# Patient Record
Sex: FEMALE | Race: WHITE | NOT HISPANIC OR LATINO | Employment: OTHER | URBAN - METROPOLITAN AREA
[De-identification: names, ages, dates, MRNs, and addresses within clinical notes are randomized per-mention and may not be internally consistent; named-entity substitution may affect disease eponyms.]

---

## 2024-08-03 ENCOUNTER — APPOINTMENT (OUTPATIENT)
Dept: CT IMAGING | Facility: CLINIC | Age: 74
End: 2024-08-03
Attending: EMERGENCY MEDICINE
Payer: COMMERCIAL

## 2024-08-03 ENCOUNTER — HOSPITAL ENCOUNTER (EMERGENCY)
Facility: CLINIC | Age: 74
Discharge: HOME | End: 2024-08-03
Attending: EMERGENCY MEDICINE | Admitting: EMERGENCY MEDICINE
Payer: COMMERCIAL

## 2024-08-03 VITALS
DIASTOLIC BLOOD PRESSURE: 76 MMHG | TEMPERATURE: 98 F | BODY MASS INDEX: 29.02 KG/M2 | HEART RATE: 75 BPM | SYSTOLIC BLOOD PRESSURE: 132 MMHG | WEIGHT: 170 LBS | RESPIRATION RATE: 16 BRPM | OXYGEN SATURATION: 99 % | HEIGHT: 64 IN

## 2024-08-03 DIAGNOSIS — R10.9 ABDOMINAL PAIN, UNSPECIFIED ABDOMINAL LOCATION: ICD-10-CM

## 2024-08-03 DIAGNOSIS — K52.9 ENTERITIS: ICD-10-CM

## 2024-08-03 LAB
ALBUMIN SERPL BCG-MCNC: 4.5 G/DL (ref 3.5–5.2)
ALP SERPL-CCNC: 58 U/L (ref 40–150)
ALT SERPL W P-5'-P-CCNC: 29 U/L (ref 0–50)
ANION GAP SERPL CALCULATED.3IONS-SCNC: 10 MMOL/L (ref 7–15)
AST SERPL W P-5'-P-CCNC: 30 U/L (ref 0–45)
BASOPHILS # BLD AUTO: 0.1 10E3/UL (ref 0–0.2)
BASOPHILS NFR BLD AUTO: 1 %
BILIRUB SERPL-MCNC: 0.6 MG/DL
BUN SERPL-MCNC: 16.5 MG/DL (ref 8–23)
CALCIUM SERPL-MCNC: 9.3 MG/DL (ref 8.8–10.4)
CHLORIDE SERPL-SCNC: 101 MMOL/L (ref 98–107)
CREAT SERPL-MCNC: 0.75 MG/DL (ref 0.51–0.95)
EGFRCR SERPLBLD CKD-EPI 2021: 84 ML/MIN/1.73M2
EOSINOPHIL # BLD AUTO: 0.1 10E3/UL (ref 0–0.7)
EOSINOPHIL NFR BLD AUTO: 3 %
ERYTHROCYTE [DISTWIDTH] IN BLOOD BY AUTOMATED COUNT: 11.9 % (ref 10–15)
GLUCOSE SERPL-MCNC: 84 MG/DL (ref 70–99)
HCO3 SERPL-SCNC: 27 MMOL/L (ref 22–29)
HCT VFR BLD AUTO: 41.2 % (ref 35–47)
HGB BLD-MCNC: 13.5 G/DL (ref 11.7–15.7)
HOLD SPECIMEN: NORMAL
HOLD SPECIMEN: NORMAL
IMM GRANULOCYTES # BLD: 0 10E3/UL
IMM GRANULOCYTES NFR BLD: 0 %
LYMPHOCYTES # BLD AUTO: 1.6 10E3/UL (ref 0.8–5.3)
LYMPHOCYTES NFR BLD AUTO: 28 %
MCH RBC QN AUTO: 32.7 PG (ref 26.5–33)
MCHC RBC AUTO-ENTMCNC: 32.8 G/DL (ref 31.5–36.5)
MCV RBC AUTO: 100 FL (ref 78–100)
MONOCYTES # BLD AUTO: 0.5 10E3/UL (ref 0–1.3)
MONOCYTES NFR BLD AUTO: 8 %
NEUTROPHILS # BLD AUTO: 3.3 10E3/UL (ref 1.6–8.3)
NEUTROPHILS NFR BLD AUTO: 60 %
NRBC # BLD AUTO: 0 10E3/UL
NRBC BLD AUTO-RTO: 0 /100
PLATELET # BLD AUTO: 162 10E3/UL (ref 150–450)
POTASSIUM SERPL-SCNC: 4 MMOL/L (ref 3.4–5.3)
PROT SERPL-MCNC: 7.3 G/DL (ref 6.4–8.3)
RBC # BLD AUTO: 4.13 10E6/UL (ref 3.8–5.2)
SODIUM SERPL-SCNC: 138 MMOL/L (ref 135–145)
WBC # BLD AUTO: 5.5 10E3/UL (ref 4–11)

## 2024-08-03 PROCEDURE — 250N000013 HC RX MED GY IP 250 OP 250 PS 637: Performed by: EMERGENCY MEDICINE

## 2024-08-03 PROCEDURE — 250N000011 HC RX IP 250 OP 636: Performed by: EMERGENCY MEDICINE

## 2024-08-03 PROCEDURE — 80053 COMPREHEN METABOLIC PANEL: CPT | Performed by: EMERGENCY MEDICINE

## 2024-08-03 PROCEDURE — 36415 COLL VENOUS BLD VENIPUNCTURE: CPT | Performed by: EMERGENCY MEDICINE

## 2024-08-03 PROCEDURE — 74177 CT ABD & PELVIS W/CONTRAST: CPT

## 2024-08-03 PROCEDURE — 85025 COMPLETE CBC W/AUTO DIFF WBC: CPT | Performed by: EMERGENCY MEDICINE

## 2024-08-03 PROCEDURE — 99285 EMERGENCY DEPT VISIT HI MDM: CPT | Mod: 25

## 2024-08-03 PROCEDURE — 250N000009 HC RX 250: Performed by: EMERGENCY MEDICINE

## 2024-08-03 RX ORDER — ACETAMINOPHEN 500 MG
1000 TABLET ORAL ONCE
Status: COMPLETED | OUTPATIENT
Start: 2024-08-03 | End: 2024-08-03

## 2024-08-03 RX ORDER — IOPAMIDOL 755 MG/ML
85 INJECTION, SOLUTION INTRAVASCULAR ONCE
Status: COMPLETED | OUTPATIENT
Start: 2024-08-03 | End: 2024-08-03

## 2024-08-03 RX ADMIN — ACETAMINOPHEN 1000 MG: 500 TABLET, FILM COATED ORAL at 14:54

## 2024-08-03 RX ADMIN — SODIUM CHLORIDE 64 ML: 9 INJECTION, SOLUTION INTRAVENOUS at 14:58

## 2024-08-03 RX ADMIN — IOPAMIDOL 85 ML: 755 INJECTION, SOLUTION INTRAVENOUS at 14:57

## 2024-08-03 ASSESSMENT — ACTIVITIES OF DAILY LIVING (ADL)
ADLS_ACUITY_SCORE: 33
ADLS_ACUITY_SCORE: 35
ADLS_ACUITY_SCORE: 33
ADLS_ACUITY_SCORE: 35

## 2024-08-03 ASSESSMENT — COLUMBIA-SUICIDE SEVERITY RATING SCALE - C-SSRS
2. HAVE YOU ACTUALLY HAD ANY THOUGHTS OF KILLING YOURSELF IN THE PAST MONTH?: NO
6. HAVE YOU EVER DONE ANYTHING, STARTED TO DO ANYTHING, OR PREPARED TO DO ANYTHING TO END YOUR LIFE?: NO
1. IN THE PAST MONTH, HAVE YOU WISHED YOU WERE DEAD OR WISHED YOU COULD GO TO SLEEP AND NOT WAKE UP?: NO

## 2024-08-03 NOTE — ED PROVIDER NOTES
"  Emergency Department Note      History of Present Illness     Chief Complaint   Abdominal Pain (LLQ)      HPI   Sylvia Thompson is a 74 year old female with a history of diverticulosis who presents with lower left quadrant abdominal pain. This pain began yesterday and reportedly feels like her prior instances of diverticulosis. She denies constipation, rectal bleeding, nausea, and emesis. NO urinary symptoms. Her last tylenol was at 0600.    Independent Historian   None        Past Medical History     Medical History and Problem List   Acute lower GI bleeding  Atherosclerosis of coronary artery  Colitis  Diverticula, colon  Hypertension  Floaters  Hyperlipidemia  Lesion of plantar nerve  Oral infection  Pain of right thumb  Widening aorta    Medications   Tylenol  Augmentin  Atorvastatin  Dulcolax  Cipro  Diflucan  Zestril  Metoprolol  Flagyl  Macrobid  Zofran      Surgical History   Hysterectomy  Appendectomy  Hand surgery    Physical Exam     Patient Vitals for the past 24 hrs:   BP Temp Temp src Pulse Resp SpO2 Height Weight   08/03/24 1620 132/76 -- -- 75 16 99 % -- --   08/03/24 1430 129/71 -- -- 64 16 97 % -- --   08/03/24 1415 132/70 -- -- -- 16 99 % -- --   08/03/24 1055 129/72 -- -- -- -- -- -- --   08/03/24 1054 -- 98  F (36.7  C) Temporal 67 16 97 % 1.626 m (5' 4\") 77.1 kg (170 lb)     Physical Exam  VS: Reviewed per above  HENT: Mucous membranes moist  EYES: sclera anicteric  CV: Rate as noted,  regular rhythm.   RESP: Effort normal. Breath sounds are normal bilaterally.  GI: mild llq tenderness without rebound/guarding, not distended.  NEURO: Alert, moving all extremities  MSK: No deformity of the extremities  SKIN: Warm and dry    Diagnostics     Lab Results   Labs Ordered and Resulted from Time of ED Arrival to Time of ED Departure   COMPREHENSIVE METABOLIC PANEL - Normal       Result Value    Sodium 138      Potassium 4.0      Carbon Dioxide (CO2) 27      Anion Gap 10      Urea Nitrogen 16.5      " Creatinine 0.75      GFR Estimate 84      Calcium 9.3      Chloride 101      Glucose 84      Alkaline Phosphatase 58      AST 30      ALT 29      Protein Total 7.3      Albumin 4.5      Bilirubin Total 0.6     CBC WITH PLATELETS AND DIFFERENTIAL    WBC Count 5.5      RBC Count 4.13      Hemoglobin 13.5      Hematocrit 41.2            MCH 32.7      MCHC 32.8      RDW 11.9      Platelet Count 162      % Neutrophils 60      % Lymphocytes 28      % Monocytes 8      % Eosinophils 3      % Basophils 1      % Immature Granulocytes 0      NRBCs per 100 WBC 0      Absolute Neutrophils 3.3      Absolute Lymphocytes 1.6      Absolute Monocytes 0.5      Absolute Eosinophils 0.1      Absolute Basophils 0.1      Absolute Immature Granulocytes 0.0      Absolute NRBCs 0.0       Imaging   CT Abdomen Pelvis w Contrast   Final Result   IMPRESSION:    1.  Mild diffuse wall thickening of multiple jejunal loops which can be seen with an enteritis.   2.  Colonic diverticulosis without findings of acute diverticulitis.      Report per radiology.    ED Course      Medications Administered   Medications   acetaminophen (TYLENOL) tablet 1,000 mg (1,000 mg Oral $Given 8/3/24 1454)   iopamidol (ISOVUE-370) solution 85 mL (85 mLs Intravenous $Given 8/3/24 1457)   sodium chloride 0.9 % bag 500mL for CT scan flush use (64 mLs Intravenous $Given 8/3/24 1458)       Procedures   Procedures     Discussion of Management   None    ED Course   ED Course as of 08/03/24 1737   Sat Aug 03, 2024   1443 I obtained history and examined the patient as noted above.     1608 I re-evaluated and updated patient.  I explained findings to the patient and we discussed plan for discharge. The patient is comfortable with this plan.         Additional Documentation  None    Medical Decision Making / Diagnosis     CMS Diagnoses: None    MIPS       None    MDM   Sylvia Thompson is a 74 year old female who presents to the ER for evaluation of few days of left lower  abdominal discomfort in the setting of history of diverticulitis.  Vital signs are reassuring.  Her abdominal exam is actually quite benign despite some mild left lower quadrant tenderness.  Labs are reassuring without leukocytosis or electrolyte derangement or GENE.  CT abdomen shows some enteritis but no diverticulitis.  Plan for bland diet and close monitoring of symptoms for progression.  Patient felt that she could continue managing her pain with Tylenol.  She does have upcoming colonoscopy and thus I encouraged her to discuss with her GI provider her recent symptoms at that appointment or at follow-up appointments.  Return precautions discussed prior to discharge.    Disposition   The patient was discharged.     Diagnosis     ICD-10-CM    1. Enteritis  K52.9       2. Abdominal pain, unspecified abdominal location  R10.9            Discharge Medications   There are no discharge medications for this patient.        Scribe Disclosure:  I, Armand Gaspar, am serving as a scribe at 2:47 PM on 8/3/2024 to document services personally performed by Zurdo Lucas MD, based on my observations and the provider's statements to me.        Zurdo Lucas MD  08/03/24 5324

## 2024-08-03 NOTE — DISCHARGE INSTRUCTIONS
You have evidence of inflammation of the small intestines on your CT scan.  If you develop fevers, worsening pain, significant blood in the stool, new concerns, please return to the ER for reassessment.

## 2024-08-03 NOTE — ED TRIAGE NOTES
Patient has a history of diverticulosis, presenting today with complaints of LLQ abdominal pain since yesterday. Patient denied any rectal bleeding, denied nausea and vomiting.      Triage Assessment (Adult)       Row Name 08/03/24 1053          Triage Assessment    Airway WDL WDL        Respiratory WDL    Respiratory WDL WDL        Skin Circulation/Temperature WDL    Skin Circulation/Temperature WDL WDL        Cardiac WDL    Cardiac WDL WDL        Peripheral/Neurovascular WDL    Peripheral Neurovascular WDL WDL        Cognitive/Neuro/Behavioral WDL    Cognitive/Neuro/Behavioral WDL WDL